# Patient Record
Sex: FEMALE | Race: BLACK OR AFRICAN AMERICAN | NOT HISPANIC OR LATINO
[De-identification: names, ages, dates, MRNs, and addresses within clinical notes are randomized per-mention and may not be internally consistent; named-entity substitution may affect disease eponyms.]

---

## 2017-11-07 PROBLEM — Z00.00 ENCOUNTER FOR PREVENTIVE HEALTH EXAMINATION: Status: ACTIVE | Noted: 2017-11-07

## 2017-11-11 ENCOUNTER — APPOINTMENT (OUTPATIENT)
Dept: PLASTIC SURGERY | Facility: CLINIC | Age: 67
End: 2017-11-11
Payer: COMMERCIAL

## 2017-11-11 PROCEDURE — 99204 OFFICE O/P NEW MOD 45 MIN: CPT

## 2017-11-21 ENCOUNTER — APPOINTMENT (OUTPATIENT)
Dept: ORTHOPEDIC SURGERY | Facility: CLINIC | Age: 67
End: 2017-11-21

## 2017-12-12 ENCOUNTER — APPOINTMENT (OUTPATIENT)
Dept: MRI IMAGING | Facility: CLINIC | Age: 67
End: 2017-12-12
Payer: COMMERCIAL

## 2017-12-12 ENCOUNTER — OUTPATIENT (OUTPATIENT)
Dept: OUTPATIENT SERVICES | Facility: HOSPITAL | Age: 67
LOS: 1 days | End: 2017-12-12

## 2017-12-12 PROCEDURE — 73221 MRI JOINT UPR EXTREM W/O DYE: CPT | Mod: 26,LT

## 2017-12-12 PROCEDURE — 73221 MRI JOINT UPR EXTREM W/O DYE: CPT | Mod: 26,76,RT

## 2017-12-29 ENCOUNTER — APPOINTMENT (OUTPATIENT)
Dept: PLASTIC SURGERY | Facility: CLINIC | Age: 67
End: 2017-12-29
Payer: COMMERCIAL

## 2017-12-29 DIAGNOSIS — M25.532 PAIN IN RIGHT WRIST: ICD-10-CM

## 2017-12-29 DIAGNOSIS — M25.531 PAIN IN RIGHT WRIST: ICD-10-CM

## 2017-12-29 PROCEDURE — 99213 OFFICE O/P EST LOW 20 MIN: CPT

## 2018-07-02 ENCOUNTER — APPOINTMENT (OUTPATIENT)
Dept: MAMMOGRAPHY | Facility: HOSPITAL | Age: 68
End: 2018-07-02

## 2019-02-20 ENCOUNTER — APPOINTMENT (OUTPATIENT)
Dept: VASCULAR SURGERY | Facility: CLINIC | Age: 69
End: 2019-02-20
Payer: MEDICARE

## 2019-02-20 DIAGNOSIS — I83.90 ASYMPTOMATIC VARICOSE VEINS OF UNSPECIFIED LOWER EXTREMITY: ICD-10-CM

## 2019-02-20 PROCEDURE — 99204 OFFICE O/P NEW MOD 45 MIN: CPT

## 2019-02-20 PROCEDURE — 93970 EXTREMITY STUDY: CPT

## 2019-02-20 RX ORDER — ROSUVASTATIN CALCIUM 5 MG/1
TABLET, FILM COATED ORAL
Refills: 0 | Status: ACTIVE | COMMUNITY

## 2019-02-20 RX ORDER — RAMIPRIL 5 MG/1
CAPSULE ORAL
Refills: 0 | Status: ACTIVE | COMMUNITY

## 2019-03-01 NOTE — CONSULT LETTER
[Dear  ___] : Dear  [unfilled], [Consult Closing:] : Thank you very much for allowing me to participate in the care of this patient.  If you have any questions, please do not hesitate to contact me. [Sincerely,] : Sincerely, [FreeTextEntry2] : Joey Miller M.D.\par [address] [place], [place] [address]th [place]\[facility] [place] [address] [FreeTextEntry1] : I saw Ms. Manuel today. She is reporting intermittent burning and tingling to the lateral aspect of her thighs for the last three years. Her symptoms improve with rest and while sitting down, otherwise she reports being very active and walking daily. Also, she reports approximately 1 month ago, she was awakened at night with right lateral upper thigh burning and there was a small tender area with a lump that within a couple of day it became a bruise. She denies any trauma to the area and this is now resolved and nontender. Denies any rest pain, chest pain, no history of varicose veins or bleeding/clotting disorders. 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 \par \par On exam, she has palpable pulses throughout with good capillary refill. No varicose veins present. \par \par Venous ultrasound completed during visit is negative for any DVT or SVT. There is some venous reflux on the left leg suggestive of mild venous insufficiency, non on the the right leg. \par \par Given exam and ultrasound study, I suspect her leg symptoms are neurological in origin probably due to arthritis of her back. No vascular intervention needed at this time. The right upper thigh area of concern I suspect is a resolved hematoma or ecchymosis. She may follow-up as needed. \par \par My complete EMR office note is below for your records.  [FreeTextEntry3] : Will Sena M.D. \par , Surgical [facility]\par , Department of Surgery [facility]\par Professor of Surgery, [employer] at [facility]

## 2019-03-01 NOTE — END OF VISIT
[FreeTextEntry3] : All medical record entries made by the Scribe were at my, Dr. Sena's direction and personally dictated by me on 2/20/2019. I have reviewed the chart and agree that the record accurately reflects my personal performance of the history, physical exam, assessment and plan. I have also personally directed, reviewed, and agreed with the chart.

## 2019-03-01 NOTE — HISTORY OF PRESENT ILLNESS
[FreeTextEntry1] : 69 y/o F non-smoker with history of borderline HTN and HLD, referred by Dr. Miller for initial evaluation of intermittent burning and tingling to her lateral thighs. Patient reports for the last three years she has experienced intermittent burning, tingling, and numbness to her lateral thighs, when she walks for long periods of times. Improved with rest and sitting. She also reports that recently approximately 1 month ago, she was awoken at night by this sensation and per patient the a small area to her right lateral thigh was tender and there was a lump that became a bruise. This is now resolved, no tenderness.  Pt denies any trauma to the area.  \par \par Patient also reports that 1 month ago she spilled coffee on her left medial thigh and received a severe burn, treated at [facility]. 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 \par \par Patient denies any history of malignancy. Does report history of ovarian cancer in her mother.

## 2019-03-01 NOTE — PROCEDURE
[FreeTextEntry1] : BLE doppler shows no evidence of DVT or SVT. THere is GSV reflux on the left, no GSV reflux on the right

## 2019-03-01 NOTE — ASSESSMENT
[Arterial/Venous Disease] : arterial/venous disease [FreeTextEntry1] : 67 y/o F with intermittent burning, tingling, and numbness to b/l thighs. Suspect her leg symptoms are due to arthritis of her back, symptoms sound neurological in origin.  Doppler today is normal. No vascular intervention needed at this time. She will follow-up here as needed. \par \par the area of concern one month ago in the lateral thight/hip area has resolved and was probably a hematoma or ecchymosis that resolved.  Pt reassured.

## 2019-03-01 NOTE — PHYSICAL EXAM
[Respiratory Effort] : normal respiratory effort [Normal Breath Sounds] : Normal breath sounds [Normal Heart Sounds] : normal heart sounds [JVD] : no jugular venous distention  [de-identified] : Well appeaing, NAD, articulate and friendly.  [de-identified] : NCAT [de-identified] : Clear [FreeTextEntry1] : Left thigh has a large area, almost the entire anterior thigh of healed skin grafting.  \par Right thigh, lateral and near the hip is the area of recent concern. No ecchymosis or hematoma present. A few spider veins are noted.

## 2020-03-09 ENCOUNTER — OUTPATIENT (OUTPATIENT)
Dept: OUTPATIENT SERVICES | Facility: HOSPITAL | Age: 70
LOS: 1 days | End: 2020-03-09
Payer: MEDICARE

## 2020-03-09 PROCEDURE — 72148 MRI LUMBAR SPINE W/O DYE: CPT | Mod: 26

## 2020-03-09 PROCEDURE — 72148 MRI LUMBAR SPINE W/O DYE: CPT

## 2020-03-18 ENCOUNTER — APPOINTMENT (OUTPATIENT)
Dept: NEUROLOGY | Facility: CLINIC | Age: 70
End: 2020-03-18

## 2020-04-07 ENCOUNTER — APPOINTMENT (OUTPATIENT)
Dept: NEUROLOGY | Facility: CLINIC | Age: 70
End: 2020-04-07
Payer: MEDICARE

## 2020-04-07 PROCEDURE — 99202 OFFICE O/P NEW SF 15 MIN: CPT | Mod: 95

## 2020-04-07 NOTE — HISTORY OF PRESENT ILLNESS
[Home] : at home, [unfilled] , at the time of the visit. [Other Location: e.g. Home (Enter Location, City,State)___] : at [unfilled] [Patient] : the patient [FreeTextEntry1] : S: 69 year old female with back pain and radicular  symptoms in the LE - no diabetes - no bowel or bladder symptoms - no UE neurological symptoms - pain with movement of the Right Shoulder - no headaches or dizziness - no tremors - no acute medical problems \par \par O: awake and alert \par  Cranial nerves - full EOM \par  No facial weakness - no diplopia \par Gait - no foot drop\par NEGATIVE ROMBERG\par No foot drop\par  No tremors - no dysmetria  \par \par A:: Lumbar radiculopathy\par  Less likely neuropathy\par \par Pl: in view of the MRI lumbar spine and rec Dr Kailash Garvey \par  Home exercises

## 2020-05-11 PROBLEM — Z78.9 SOCIAL ALCOHOL USE: Status: ACTIVE | Noted: 2020-05-11

## 2020-05-11 PROBLEM — I10 HIGH BLOOD PRESSURE: Status: RESOLVED | Noted: 2020-05-11 | Resolved: 2020-05-11

## 2020-05-11 PROBLEM — E78.5 HYPERLIPIDEMIA: Status: RESOLVED | Noted: 2020-05-11 | Resolved: 2020-05-11

## 2020-05-11 PROBLEM — M51.26 LUMBAR DISC HERNIATION: Status: ACTIVE | Noted: 2020-04-07

## 2020-05-11 PROBLEM — R20.0 LEG NUMBNESS: Status: ACTIVE | Noted: 2019-02-20

## 2020-05-11 NOTE — REASON FOR VISIT
[New Patient Visit] : a new patient visit [Referred By: _________] : Patient was referred by CHARISSE

## 2020-05-12 ENCOUNTER — APPOINTMENT (OUTPATIENT)
Dept: SPINE | Facility: CLINIC | Age: 70
End: 2020-05-12
Payer: MEDICARE

## 2020-05-12 DIAGNOSIS — E78.5 HYPERLIPIDEMIA, UNSPECIFIED: ICD-10-CM

## 2020-05-12 DIAGNOSIS — R20.0 ANESTHESIA OF SKIN: ICD-10-CM

## 2020-05-12 DIAGNOSIS — M51.26 OTHER INTERVERTEBRAL DISC DISPLACEMENT, LUMBAR REGION: ICD-10-CM

## 2020-05-12 DIAGNOSIS — I10 ESSENTIAL (PRIMARY) HYPERTENSION: ICD-10-CM

## 2020-05-12 DIAGNOSIS — Z78.9 OTHER SPECIFIED HEALTH STATUS: ICD-10-CM

## 2020-05-12 PROCEDURE — 99203 OFFICE O/P NEW LOW 30 MIN: CPT | Mod: 95

## 2020-05-13 ENCOUNTER — TRANSCRIPTION ENCOUNTER (OUTPATIENT)
Age: 70
End: 2020-05-13

## 2020-05-13 NOTE — DATA REVIEWED
[de-identified] : L-spine wo on 3/9/2020 in PACS showed moderate R paracentral herniated disc @ L4-L5 with nerve root compression. 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

## 2020-05-13 NOTE — REVIEW OF SYSTEMS
[Tingling] : tingling [As Noted in HPI] : as noted in HPI [Negative] : Endocrine [FreeTextEntry9] : mild back pain

## 2020-05-13 NOTE — ASSESSMENT
[FreeTextEntry1] : 68 yo F with lumbar herniated disc. Her symptoms got improved at this time. Images were reviewed with the patient. Given her stable images and symptoms improvement, no surgical intervention is recommended at this time. \par \par - continue conservative management\par - PT (Virtual therapy set up by Alexandra)\par - RTC for new/worsening symptoms

## 2020-05-13 NOTE — END OF VISIT
[Time Spent: ___ minutes] : I have spent [unfilled] minutes of time on the encounter. [FreeTextEntry3] : The patient is a 69-year-old woman with severe leg pain.  She has had several prior episodes.  The most current episode happened after she was bending down in the shower.  She saw my colleague Dr. Francisco from neurology who ordered an MRI.  We reviewed it today together while on a telehealth visit.  She does have evidence of disc herniations however she has no neuroforaminal compression.  Her symptoms are improving I do not think that surgery is in her best interest.  We recommend that she continue with physical therapy and conservative measures and will be available on an as-needed basis.  I communicated with her neurologist about her recommendations.\par \par Cecil Garvey M.D., M.Sc.\par \par Department of Neurosurgery\par [facility] [employer] [facility]\par [facility]\par [facility]\par [place], [place]\pushpa [email or web address] [phone]

## 2020-05-13 NOTE — HISTORY OF PRESENT ILLNESS
[de-identified] : Today consult was initiated via telehealth using real time 2 way audio visual technology. The patient, AMRITA ARCHER, was located at home, [address] [place] [address], at the time of the visit. The provider, Dr. ARON CARVER and EBONIE Rodriguez were located at home at the time of the visit. Consent was obtained by Marsha Bryan prior to this visit. \par \par Patient is a 70 yo female with PMH of HTN, HLD presents for comprehensive neurosurgical consultation.\par She reports non-traumatic chronic back pain for several years which suddenly got worse 3 months ago (lasted for about a month then subsided) without significant trauma/injury with new onset of RIGHT side LE numbness/tingling without significant weakness/gait disturbance. She was seen by neurology who ordered MRI and referred to neurosx. Pain got worsen by long walking and alleviated by resting/limiting her physical activity. Currently she only has mild back pain with markedly improved R foot tingling/numbness for past one month. She denies weakness on LE, bowel/bladder dysfunction, gait disturbance. \par